# Patient Record
Sex: MALE | Race: WHITE | Employment: FULL TIME | URBAN - METROPOLITAN AREA
[De-identification: names, ages, dates, MRNs, and addresses within clinical notes are randomized per-mention and may not be internally consistent; named-entity substitution may affect disease eponyms.]

---

## 2024-03-13 ENCOUNTER — APPOINTMENT (OUTPATIENT)
Dept: ULTRASOUND IMAGING | Age: 69
End: 2024-03-13
Payer: COMMERCIAL

## 2024-03-13 ENCOUNTER — APPOINTMENT (OUTPATIENT)
Dept: GENERAL RADIOLOGY | Age: 69
End: 2024-03-13
Payer: COMMERCIAL

## 2024-03-13 ENCOUNTER — APPOINTMENT (OUTPATIENT)
Dept: CT IMAGING | Age: 69
End: 2024-03-13
Payer: COMMERCIAL

## 2024-03-13 ENCOUNTER — HOSPITAL ENCOUNTER (EMERGENCY)
Age: 69
Discharge: ANOTHER HOSPITAL | End: 2024-03-13
Attending: STUDENT IN AN ORGANIZED HEALTH CARE EDUCATION/TRAINING PROGRAM
Payer: COMMERCIAL

## 2024-03-13 VITALS
OXYGEN SATURATION: 91 % | WEIGHT: 150 LBS | SYSTOLIC BLOOD PRESSURE: 138 MMHG | TEMPERATURE: 97.4 F | BODY MASS INDEX: 22.22 KG/M2 | RESPIRATION RATE: 45 BRPM | HEIGHT: 69 IN | HEART RATE: 114 BPM | DIASTOLIC BLOOD PRESSURE: 65 MMHG

## 2024-03-13 DIAGNOSIS — J96.01 ACUTE RESPIRATORY FAILURE WITH HYPOXIA (HCC): Primary | ICD-10-CM

## 2024-03-13 DIAGNOSIS — R79.89 ELEVATED BRAIN NATRIURETIC PEPTIDE (BNP) LEVEL: ICD-10-CM

## 2024-03-13 DIAGNOSIS — J44.1 COPD EXACERBATION (HCC): ICD-10-CM

## 2024-03-13 DIAGNOSIS — R60.0 BILATERAL LOWER EXTREMITY EDEMA: ICD-10-CM

## 2024-03-13 DIAGNOSIS — J18.9 PNEUMONIA OF RIGHT LUNG DUE TO INFECTIOUS ORGANISM, UNSPECIFIED PART OF LUNG: ICD-10-CM

## 2024-03-13 LAB
ALBUMIN SERPL-MCNC: 3.6 G/DL (ref 3.5–5.2)
ALP SERPL-CCNC: 119 U/L (ref 40–129)
ALT SERPL-CCNC: 45 U/L (ref 0–40)
ANION GAP SERPL CALCULATED.3IONS-SCNC: 6 MMOL/L (ref 7–16)
AST SERPL-CCNC: 23 U/L (ref 0–39)
B PARAP IS1001 DNA NPH QL NAA+NON-PROBE: NOT DETECTED
B PERT DNA SPEC QL NAA+PROBE: NOT DETECTED
BASOPHILS # BLD: 0.07 K/UL (ref 0–0.2)
BASOPHILS NFR BLD: 1 % (ref 0–2)
BILIRUB SERPL-MCNC: 0.6 MG/DL (ref 0–1.2)
BNP SERPL-MCNC: 2480 PG/ML (ref 0–125)
BUN SERPL-MCNC: 11 MG/DL (ref 6–23)
C PNEUM DNA NPH QL NAA+NON-PROBE: NOT DETECTED
CALCIUM SERPL-MCNC: 8.8 MG/DL (ref 8.6–10.2)
CHLORIDE SERPL-SCNC: 104 MMOL/L (ref 98–107)
CO2 SERPL-SCNC: 34 MMOL/L (ref 22–29)
CREAT SERPL-MCNC: 0.8 MG/DL (ref 0.7–1.2)
EKG ATRIAL RATE: 105 BPM
EKG P AXIS: 80 DEGREES
EKG P-R INTERVAL: 140 MS
EKG Q-T INTERVAL: 340 MS
EKG QRS DURATION: 92 MS
EKG QTC CALCULATION (BAZETT): 449 MS
EKG R AXIS: 94 DEGREES
EKG T AXIS: 66 DEGREES
EKG VENTRICULAR RATE: 105 BPM
EOSINOPHIL # BLD: 0.08 K/UL (ref 0.05–0.5)
EOSINOPHILS RELATIVE PERCENT: 1 % (ref 0–6)
ERYTHROCYTE [DISTWIDTH] IN BLOOD BY AUTOMATED COUNT: 14.9 % (ref 11.5–15)
FLUAV RNA NPH QL NAA+NON-PROBE: NOT DETECTED
FLUAV RNA RESP QL NAA+PROBE: NOT DETECTED
FLUBV RNA NPH QL NAA+NON-PROBE: NOT DETECTED
FLUBV RNA RESP QL NAA+PROBE: NOT DETECTED
GFR SERPL CREATININE-BSD FRML MDRD: >60 ML/MIN/1.73M2
GLUCOSE SERPL-MCNC: 138 MG/DL (ref 74–99)
HADV DNA NPH QL NAA+NON-PROBE: NOT DETECTED
HCOV 229E RNA NPH QL NAA+NON-PROBE: NOT DETECTED
HCOV HKU1 RNA NPH QL NAA+NON-PROBE: NOT DETECTED
HCOV NL63 RNA NPH QL NAA+NON-PROBE: NOT DETECTED
HCOV OC43 RNA NPH QL NAA+NON-PROBE: DETECTED
HCT VFR BLD AUTO: 49.5 % (ref 37–54)
HGB BLD-MCNC: 16.3 G/DL (ref 12.5–16.5)
HMPV RNA NPH QL NAA+NON-PROBE: NOT DETECTED
HPIV1 RNA NPH QL NAA+NON-PROBE: NOT DETECTED
HPIV2 RNA NPH QL NAA+NON-PROBE: NOT DETECTED
HPIV3 RNA NPH QL NAA+NON-PROBE: NOT DETECTED
HPIV4 RNA NPH QL NAA+NON-PROBE: NOT DETECTED
IMM GRANULOCYTES # BLD AUTO: 0.03 K/UL (ref 0–0.58)
IMM GRANULOCYTES NFR BLD: 1 % (ref 0–5)
INR PPP: 1.4
LYMPHOCYTES NFR BLD: 1.15 K/UL (ref 1.5–4)
LYMPHOCYTES RELATIVE PERCENT: 19 % (ref 20–42)
M PNEUMO DNA NPH QL NAA+NON-PROBE: NOT DETECTED
MAGNESIUM SERPL-MCNC: 1.8 MG/DL (ref 1.6–2.6)
MCH RBC QN AUTO: 33.7 PG (ref 26–35)
MCHC RBC AUTO-ENTMCNC: 32.9 G/DL (ref 32–34.5)
MCV RBC AUTO: 102.5 FL (ref 80–99.9)
MONOCYTES NFR BLD: 0.62 K/UL (ref 0.1–0.95)
MONOCYTES NFR BLD: 10 % (ref 2–12)
NEUTROPHILS NFR BLD: 67 % (ref 43–80)
NEUTS SEG NFR BLD: 4.02 K/UL (ref 1.8–7.3)
PLATELET # BLD AUTO: 242 K/UL (ref 130–450)
PMV BLD AUTO: 9.3 FL (ref 7–12)
POTASSIUM SERPL-SCNC: 4.2 MMOL/L (ref 3.5–5)
PROCALCITONIN SERPL-MCNC: 0.07 NG/ML (ref 0–0.08)
PROT SERPL-MCNC: 6.7 G/DL (ref 6.4–8.3)
PROTHROMBIN TIME: 15.9 SEC (ref 9.3–12.4)
RBC # BLD AUTO: 4.83 M/UL (ref 3.8–5.8)
RSV RNA NPH QL NAA+NON-PROBE: NOT DETECTED
RV+EV RNA NPH QL NAA+NON-PROBE: NOT DETECTED
SARS-COV-2 RNA NPH QL NAA+NON-PROBE: NOT DETECTED
SARS-COV-2 RNA RESP QL NAA+PROBE: NOT DETECTED
SODIUM SERPL-SCNC: 144 MMOL/L (ref 132–146)
SOURCE: NORMAL
SPECIMEN DESCRIPTION: ABNORMAL
SPECIMEN DESCRIPTION: NORMAL
TROPONIN I SERPL HS-MCNC: 17 NG/L (ref 0–11)
TROPONIN I SERPL HS-MCNC: 18 NG/L (ref 0–11)
WBC OTHER # BLD: 6 K/UL (ref 4.5–11.5)

## 2024-03-13 PROCEDURE — 83735 ASSAY OF MAGNESIUM: CPT

## 2024-03-13 PROCEDURE — 0202U NFCT DS 22 TRGT SARS-COV-2: CPT

## 2024-03-13 PROCEDURE — 93005 ELECTROCARDIOGRAM TRACING: CPT | Performed by: STUDENT IN AN ORGANIZED HEALTH CARE EDUCATION/TRAINING PROGRAM

## 2024-03-13 PROCEDURE — 2500000003 HC RX 250 WO HCPCS: Performed by: STUDENT IN AN ORGANIZED HEALTH CARE EDUCATION/TRAINING PROGRAM

## 2024-03-13 PROCEDURE — 71045 X-RAY EXAM CHEST 1 VIEW: CPT

## 2024-03-13 PROCEDURE — 93010 ELECTROCARDIOGRAM REPORT: CPT | Performed by: INTERNAL MEDICINE

## 2024-03-13 PROCEDURE — 6360000004 HC RX CONTRAST MEDICATION: Performed by: RADIOLOGY

## 2024-03-13 PROCEDURE — 85025 COMPLETE CBC W/AUTO DIFF WBC: CPT

## 2024-03-13 PROCEDURE — 84145 PROCALCITONIN (PCT): CPT

## 2024-03-13 PROCEDURE — 96375 TX/PRO/DX INJ NEW DRUG ADDON: CPT

## 2024-03-13 PROCEDURE — 87040 BLOOD CULTURE FOR BACTERIA: CPT

## 2024-03-13 PROCEDURE — 6370000000 HC RX 637 (ALT 250 FOR IP): Performed by: STUDENT IN AN ORGANIZED HEALTH CARE EDUCATION/TRAINING PROGRAM

## 2024-03-13 PROCEDURE — 85610 PROTHROMBIN TIME: CPT

## 2024-03-13 PROCEDURE — 2580000003 HC RX 258: Performed by: STUDENT IN AN ORGANIZED HEALTH CARE EDUCATION/TRAINING PROGRAM

## 2024-03-13 PROCEDURE — 96365 THER/PROPH/DIAG IV INF INIT: CPT

## 2024-03-13 PROCEDURE — 84484 ASSAY OF TROPONIN QUANT: CPT

## 2024-03-13 PROCEDURE — 93970 EXTREMITY STUDY: CPT

## 2024-03-13 PROCEDURE — 83880 ASSAY OF NATRIURETIC PEPTIDE: CPT

## 2024-03-13 PROCEDURE — 80053 COMPREHEN METABOLIC PANEL: CPT

## 2024-03-13 PROCEDURE — 87636 SARSCOV2 & INF A&B AMP PRB: CPT

## 2024-03-13 PROCEDURE — 71275 CT ANGIOGRAPHY CHEST: CPT

## 2024-03-13 PROCEDURE — 6360000002 HC RX W HCPCS: Performed by: STUDENT IN AN ORGANIZED HEALTH CARE EDUCATION/TRAINING PROGRAM

## 2024-03-13 PROCEDURE — 99285 EMERGENCY DEPT VISIT HI MDM: CPT

## 2024-03-13 RX ORDER — IPRATROPIUM BROMIDE AND ALBUTEROL SULFATE 2.5; .5 MG/3ML; MG/3ML
3 SOLUTION RESPIRATORY (INHALATION) ONCE
Status: COMPLETED | OUTPATIENT
Start: 2024-03-13 | End: 2024-03-13

## 2024-03-13 RX ORDER — FUROSEMIDE 10 MG/ML
20 INJECTION INTRAMUSCULAR; INTRAVENOUS ONCE
Status: COMPLETED | OUTPATIENT
Start: 2024-03-13 | End: 2024-03-13

## 2024-03-13 RX ADMIN — IPRATROPIUM BROMIDE AND ALBUTEROL SULFATE 3 DOSE: 2.5; .5 SOLUTION RESPIRATORY (INHALATION) at 13:50

## 2024-03-13 RX ADMIN — FUROSEMIDE 20 MG: 10 INJECTION, SOLUTION INTRAMUSCULAR; INTRAVENOUS at 14:01

## 2024-03-13 RX ADMIN — WATER 125 MG: 1 INJECTION INTRAMUSCULAR; INTRAVENOUS; SUBCUTANEOUS at 13:50

## 2024-03-13 RX ADMIN — CEFTRIAXONE SODIUM 2000 MG: 2 INJECTION, POWDER, FOR SOLUTION INTRAMUSCULAR; INTRAVENOUS at 15:22

## 2024-03-13 RX ADMIN — IOPAMIDOL 75 ML: 755 INJECTION, SOLUTION INTRAVENOUS at 14:46

## 2024-03-13 RX ADMIN — DOXYCYCLINE 100 MG: 100 INJECTION, POWDER, LYOPHILIZED, FOR SOLUTION INTRAVENOUS at 15:28

## 2024-03-13 ASSESSMENT — ENCOUNTER SYMPTOMS
CHEST TIGHTNESS: 1
WHEEZING: 1
COUGH: 1
DIARRHEA: 0
ABDOMINAL PAIN: 0
NAUSEA: 0
VOMITING: 0
CONSTIPATION: 0
SHORTNESS OF BREATH: 1

## 2024-03-13 ASSESSMENT — PAIN - FUNCTIONAL ASSESSMENT: PAIN_FUNCTIONAL_ASSESSMENT: NONE - DENIES PAIN

## 2024-03-13 NOTE — ED PROVIDER NOTES
Course, Reassessment, disposition considerations/shared decision making with patient, consults, disposition:      The cardiac monitor revealed sinus tachycardia with a heart rate in the low 100s as interpreted by me. The cardiac monitor was ordered secondary to the patient's SOB and to monitor the patient for dysrhythmia.      [facility]    ED Course as of 03/13/24 1533   Wed Mar 13, 2024   1528 Spoke with Dr. Mcknight, discussed case, patient is accepted for admission/transfer to [facility].  [VG]      ED Course User Index  [VG] Calista Tyler, DO        Medical Decision Making  Amount and/or Complexity of Data Reviewed  Labs: ordered.  Radiology: ordered.  ECG/medicine tests: ordered.    Risk  Prescription drug management.        This is a 69 y.o. male presenting for evaluation of shortness of breath, chest tightness and wheezing and bilateral leg swelling.   Please see HPI for further details, additional history and chart review.   On my evaluation today, patient is alert, oriented, nontoxic in appearance; he is chronically ill-appearing.  Does not appear significantly distressed.  Vitals are initially significant for tachycardia and tachypnea as well as SpO2 86% on room air.  His vitals did improve during this encounter.  He was placed on 3 L NC O2, does not use supplemental oxygen at baseline.    Exam findings are as documented above; he has pitting edema of his bilateral feet and ankles; distal pulses are 2+ and equal throughout.  Compartments are soft.  No redness or warmth or findings to suggest infectious process.  Lungs are diminished with wheezes bilaterally.   Patient was treated with x 3 DuoNebs as well as IV Solu-Medrol.  Labs and imaging were reviewed and labs are concerning for possible new onset CHF, so I did give him a dose of Lasix as well.  He also had findings concerning for pneumonia so I treated him with IV Rocephin and doxycycline.  With patient's history of COPD and these new findings on his workup I  3:33 PM      NOTE: This report was transcribed using voice recognition software. Every effort was made to ensure accuracy; however, inadvertent computerized transcription errors may be present            Calista Tyler DO  03/15/24 2679

## 2024-03-14 ENCOUNTER — APPOINTMENT (OUTPATIENT)
Age: 69
End: 2024-03-14
Attending: INTERNAL MEDICINE
Payer: COMMERCIAL

## 2024-03-14 ENCOUNTER — HOSPITAL ENCOUNTER (INPATIENT)
Age: 69
LOS: 1 days | Discharge: HOME | End: 2024-03-15
Attending: INTERNAL MEDICINE | Admitting: INTERNAL MEDICINE
Payer: COMMERCIAL

## 2024-03-14 DIAGNOSIS — R06.09 DYSPNEA ON EXERTION: ICD-10-CM

## 2024-03-14 DIAGNOSIS — R60.0 EDEMA OF BOTH LOWER LEGS: ICD-10-CM

## 2024-03-14 DIAGNOSIS — R60.0 EDEMA OF BOTH LOWER EXTREMITIES: ICD-10-CM

## 2024-03-14 DIAGNOSIS — J96.21 ACUTE ON CHRONIC RESPIRATORY FAILURE WITH HYPOXIA (HCC): Primary | ICD-10-CM

## 2024-03-14 PROBLEM — J44.1 COPD EXACERBATION (HCC): Status: ACTIVE | Noted: 2024-03-14

## 2024-03-14 PROBLEM — J18.9 PNEUMONIA OF RIGHT LUNG DUE TO INFECTIOUS ORGANISM: Status: ACTIVE | Noted: 2024-03-14

## 2024-03-14 PROBLEM — J96.01 ACUTE HYPOXIC RESPIRATORY FAILURE (HCC): Status: ACTIVE | Noted: 2024-03-14

## 2024-03-14 PROBLEM — F17.200 TOBACCO DEPENDENCE: Status: ACTIVE | Noted: 2024-03-14

## 2024-03-14 PROBLEM — R79.89 ELEVATED BRAIN NATRIURETIC PEPTIDE (BNP) LEVEL: Status: ACTIVE | Noted: 2024-03-14

## 2024-03-14 LAB
ALBUMIN SERPL-MCNC: 3.4 G/DL (ref 3.5–5.2)
ALP SERPL-CCNC: 120 U/L (ref 40–129)
ALT SERPL-CCNC: 39 U/L (ref 0–40)
ANION GAP SERPL CALCULATED.3IONS-SCNC: 7 MMOL/L (ref 7–16)
AST SERPL-CCNC: 19 U/L (ref 0–39)
BILIRUB SERPL-MCNC: 0.3 MG/DL (ref 0–1.2)
BUN SERPL-MCNC: 20 MG/DL (ref 6–23)
CALCIUM SERPL-MCNC: 8.6 MG/DL (ref 8.6–10.2)
CHLORIDE SERPL-SCNC: 104 MMOL/L (ref 98–107)
CO2 SERPL-SCNC: 37 MMOL/L (ref 22–29)
CREAT SERPL-MCNC: 0.9 MG/DL (ref 0.7–1.2)
CRP SERPL HS-MCNC: 4 MG/L (ref 0–5)
ECHO AO ASC DIAM: 3 CM
ECHO AO ASCENDING AORTA INDEX: 1.64 CM/M2
ECHO AV AREA PEAK VELOCITY: 1.7 CM2
ECHO AV AREA VTI: 2 CM2
ECHO AV AREA/BSA PEAK VELOCITY: 0.9 CM2/M2
ECHO AV AREA/BSA VTI: 1.1 CM2/M2
ECHO AV CUSP MM: 1.9 CM
ECHO AV MEAN GRADIENT: 10 MMHG
ECHO AV MEAN VELOCITY: 1.4 M/S
ECHO AV PEAK GRADIENT: 21 MMHG
ECHO AV PEAK VELOCITY: 2.3 M/S
ECHO AV VELOCITY RATIO: 0.57
ECHO AV VTI: 37.4 CM
ECHO BSA: 1.82 M2
ECHO EST RA PRESSURE: 3 MMHG
ECHO LA DIAMETER INDEX: 1.97 CM/M2
ECHO LA DIAMETER: 3.6 CM
ECHO LA VOL A-L A2C: 63 ML (ref 18–58)
ECHO LA VOL A-L A4C: 46 ML (ref 18–58)
ECHO LA VOL MOD A2C: 59 ML (ref 18–58)
ECHO LA VOL MOD A4C: 43 ML (ref 18–58)
ECHO LA VOLUME AREA LENGTH: 58 ML
ECHO LA VOLUME INDEX A-L A2C: 34 ML/M2 (ref 16–34)
ECHO LA VOLUME INDEX A-L A4C: 25 ML/M2 (ref 16–34)
ECHO LA VOLUME INDEX AREA LENGTH: 32 ML/M2 (ref 16–34)
ECHO LA VOLUME INDEX MOD A2C: 32 ML/M2 (ref 16–34)
ECHO LA VOLUME INDEX MOD A4C: 23 ML/M2 (ref 16–34)
ECHO LV E' LATERAL VELOCITY: 14 CM/S
ECHO LV E' SEPTAL VELOCITY: 8 CM/S
ECHO LV FRACTIONAL SHORTENING: 36 % (ref 28–44)
ECHO LV INTERNAL DIMENSION DIASTOLE INDEX: 2.57 CM/M2
ECHO LV INTERNAL DIMENSION DIASTOLIC: 4.7 CM (ref 4.2–5.9)
ECHO LV INTERNAL DIMENSION SYSTOLIC INDEX: 1.64 CM/M2
ECHO LV INTERNAL DIMENSION SYSTOLIC: 3 CM
ECHO LV ISOVOLUMETRIC RELAXATION TIME (IVRT): 50.8 MS
ECHO LV IVSD: 0.8 CM (ref 0.6–1)
ECHO LV IVSS: 1.3 CM
ECHO LV MASS 2D: 122.3 G (ref 88–224)
ECHO LV MASS INDEX 2D: 66.8 G/M2 (ref 49–115)
ECHO LV POSTERIOR WALL DIASTOLIC: 0.8 CM (ref 0.6–1)
ECHO LV POSTERIOR WALL SYSTOLIC: 1.2 CM
ECHO LV RELATIVE WALL THICKNESS RATIO: 0.34
ECHO LVOT AREA: 3.1 CM2
ECHO LVOT AV VTI INDEX: 0.65
ECHO LVOT DIAM: 2 CM
ECHO LVOT MEAN GRADIENT: 3 MMHG
ECHO LVOT PEAK GRADIENT: 6 MMHG
ECHO LVOT PEAK VELOCITY: 1.3 M/S
ECHO LVOT STROKE VOLUME INDEX: 41.9 ML/M2
ECHO LVOT SV: 76.6 ML
ECHO LVOT VTI: 24.4 CM
ECHO MV "A" WAVE DURATION: 110.7 MSEC
ECHO MV A VELOCITY: 1.23 M/S
ECHO MV AREA PHT: 4.6 CM2
ECHO MV AREA VTI: 2.8 CM2
ECHO MV E DECELERATION TIME (DT): 211.4 MS
ECHO MV E VELOCITY: 1.12 M/S
ECHO MV E/A RATIO: 0.91
ECHO MV E/E' LATERAL: 8
ECHO MV E/E' RATIO (AVERAGED): 11
ECHO MV LVOT VTI INDEX: 1.12
ECHO MV MAX VELOCITY: 1.2 M/S
ECHO MV MEAN GRADIENT: 3 MMHG
ECHO MV MEAN VELOCITY: 0.8 M/S
ECHO MV PEAK GRADIENT: 6 MMHG
ECHO MV PRESSURE HALF TIME (PHT): 47.8 MS
ECHO MV VTI: 27.3 CM
ECHO PV MAX VELOCITY: 1.2 M/S
ECHO PV MEAN GRADIENT: 2 MMHG
ECHO PV MEAN VELOCITY: 0.7 M/S
ECHO PV PEAK GRADIENT: 5 MMHG
ECHO PV VTI: 18.9 CM
ECHO PVEIN A DURATION: 92.3 MS
ECHO PVEIN A VELOCITY: 0.4 M/S
ECHO PVEIN PEAK D VELOCITY: 0.6 M/S
ECHO PVEIN PEAK S VELOCITY: 0.8 M/S
ECHO PVEIN S/D RATIO: 1.3
ECHO RIGHT VENTRICULAR SYSTOLIC PRESSURE (RVSP): 70 MMHG
ECHO RV INTERNAL DIMENSION: 3.9 CM
ECHO RV TAPSE: 3 CM (ref 1.7–?)
ECHO TV REGURGITANT MAX VELOCITY: 4.09 M/S
ECHO TV REGURGITANT PEAK GRADIENT: 67 MMHG
ERYTHROCYTE [SEDIMENTATION RATE] IN BLOOD BY WESTERGREN METHOD: 2 MM/HR (ref 0–15)
GFR SERPL CREATININE-BSD FRML MDRD: >60 ML/MIN/1.73M2
GLUCOSE SERPL-MCNC: 119 MG/DL (ref 74–99)
POTASSIUM SERPL-SCNC: 4.9 MMOL/L (ref 3.5–5)
PROT SERPL-MCNC: 6.3 G/DL (ref 6.4–8.3)
SODIUM SERPL-SCNC: 148 MMOL/L (ref 132–146)
T4 FREE SERPL-MCNC: 1.3 NG/DL (ref 0.9–1.7)
TSH SERPL DL<=0.05 MIU/L-ACNC: 0.21 UIU/ML (ref 0.27–4.2)

## 2024-03-14 PROCEDURE — 87449 NOS EACH ORGANISM AG IA: CPT

## 2024-03-14 PROCEDURE — 6360000002 HC RX W HCPCS: Performed by: STUDENT IN AN ORGANIZED HEALTH CARE EDUCATION/TRAINING PROGRAM

## 2024-03-14 PROCEDURE — 6370000000 HC RX 637 (ALT 250 FOR IP): Performed by: NURSE PRACTITIONER

## 2024-03-14 PROCEDURE — 87899 AGENT NOS ASSAY W/OPTIC: CPT

## 2024-03-14 PROCEDURE — 36415 COLL VENOUS BLD VENIPUNCTURE: CPT

## 2024-03-14 PROCEDURE — 6370000000 HC RX 637 (ALT 250 FOR IP): Performed by: STUDENT IN AN ORGANIZED HEALTH CARE EDUCATION/TRAINING PROGRAM

## 2024-03-14 PROCEDURE — 84439 ASSAY OF FREE THYROXINE: CPT

## 2024-03-14 PROCEDURE — 6360000002 HC RX W HCPCS: Performed by: NURSE PRACTITIONER

## 2024-03-14 PROCEDURE — 93306 TTE W/DOPPLER COMPLETE: CPT | Performed by: INTERNAL MEDICINE

## 2024-03-14 PROCEDURE — 86140 C-REACTIVE PROTEIN: CPT

## 2024-03-14 PROCEDURE — 84443 ASSAY THYROID STIM HORMONE: CPT

## 2024-03-14 PROCEDURE — 85652 RBC SED RATE AUTOMATED: CPT

## 2024-03-14 PROCEDURE — 2700000000 HC OXYGEN THERAPY PER DAY

## 2024-03-14 PROCEDURE — 2580000003 HC RX 258: Performed by: NURSE PRACTITIONER

## 2024-03-14 PROCEDURE — 80053 COMPREHEN METABOLIC PANEL: CPT

## 2024-03-14 PROCEDURE — APPSS60 APP SPLIT SHARED TIME 46-60 MINUTES: Performed by: NURSE PRACTITIONER

## 2024-03-14 PROCEDURE — 93306 TTE W/DOPPLER COMPLETE: CPT

## 2024-03-14 PROCEDURE — 87081 CULTURE SCREEN ONLY: CPT

## 2024-03-14 PROCEDURE — 87070 CULTURE OTHR SPECIMN AEROBIC: CPT

## 2024-03-14 PROCEDURE — 87205 SMEAR GRAM STAIN: CPT

## 2024-03-14 PROCEDURE — 94640 AIRWAY INHALATION TREATMENT: CPT

## 2024-03-14 PROCEDURE — 99222 1ST HOSP IP/OBS MODERATE 55: CPT | Performed by: STUDENT IN AN ORGANIZED HEALTH CARE EDUCATION/TRAINING PROGRAM

## 2024-03-14 PROCEDURE — 2060000000 HC ICU INTERMEDIATE R&B

## 2024-03-14 PROCEDURE — 94664 DEMO&/EVAL PT USE INHALER: CPT

## 2024-03-14 RX ORDER — BENZONATATE 100 MG/1
100 CAPSULE ORAL 3 TIMES DAILY PRN
Status: DISCONTINUED | OUTPATIENT
Start: 2024-03-14 | End: 2024-03-15 | Stop reason: HOSPADM

## 2024-03-14 RX ORDER — ENOXAPARIN SODIUM 100 MG/ML
40 INJECTION SUBCUTANEOUS DAILY
Status: DISCONTINUED | OUTPATIENT
Start: 2024-03-14 | End: 2024-03-15 | Stop reason: HOSPADM

## 2024-03-14 RX ORDER — IPRATROPIUM BROMIDE AND ALBUTEROL SULFATE 2.5; .5 MG/3ML; MG/3ML
1 SOLUTION RESPIRATORY (INHALATION)
Status: DISCONTINUED | OUTPATIENT
Start: 2024-03-14 | End: 2024-03-15 | Stop reason: HOSPADM

## 2024-03-14 RX ORDER — ACETAMINOPHEN 650 MG/1
650 SUPPOSITORY RECTAL EVERY 6 HOURS PRN
Status: DISCONTINUED | OUTPATIENT
Start: 2024-03-14 | End: 2024-03-15 | Stop reason: HOSPADM

## 2024-03-14 RX ORDER — ALBUTEROL SULFATE 2.5 MG/3ML
2.5 SOLUTION RESPIRATORY (INHALATION)
Status: DISCONTINUED | OUTPATIENT
Start: 2024-03-14 | End: 2024-03-14

## 2024-03-14 RX ORDER — FUROSEMIDE 10 MG/ML
20 INJECTION INTRAMUSCULAR; INTRAVENOUS 2 TIMES DAILY
Status: DISCONTINUED | OUTPATIENT
Start: 2024-03-14 | End: 2024-03-14

## 2024-03-14 RX ORDER — POTASSIUM CHLORIDE 7.45 MG/ML
10 INJECTION INTRAVENOUS PRN
Status: DISCONTINUED | OUTPATIENT
Start: 2024-03-14 | End: 2024-03-15 | Stop reason: HOSPADM

## 2024-03-14 RX ORDER — DOXYCYCLINE HYCLATE 100 MG/1
100 CAPSULE ORAL EVERY 12 HOURS SCHEDULED
Status: DISCONTINUED | OUTPATIENT
Start: 2024-03-14 | End: 2024-03-14

## 2024-03-14 RX ORDER — ONDANSETRON 2 MG/ML
4 INJECTION INTRAMUSCULAR; INTRAVENOUS EVERY 6 HOURS PRN
Status: DISCONTINUED | OUTPATIENT
Start: 2024-03-14 | End: 2024-03-15 | Stop reason: HOSPADM

## 2024-03-14 RX ORDER — SODIUM CHLORIDE 9 MG/ML
INJECTION, SOLUTION INTRAVENOUS PRN
Status: DISCONTINUED | OUTPATIENT
Start: 2024-03-14 | End: 2024-03-15 | Stop reason: HOSPADM

## 2024-03-14 RX ORDER — POTASSIUM CHLORIDE 20 MEQ/1
40 TABLET, EXTENDED RELEASE ORAL PRN
Status: DISCONTINUED | OUTPATIENT
Start: 2024-03-14 | End: 2024-03-15 | Stop reason: HOSPADM

## 2024-03-14 RX ORDER — SODIUM CHLORIDE 0.9 % (FLUSH) 0.9 %
5-40 SYRINGE (ML) INJECTION PRN
Status: DISCONTINUED | OUTPATIENT
Start: 2024-03-14 | End: 2024-03-15 | Stop reason: HOSPADM

## 2024-03-14 RX ORDER — MAGNESIUM SULFATE IN WATER 40 MG/ML
2000 INJECTION, SOLUTION INTRAVENOUS PRN
Status: DISCONTINUED | OUTPATIENT
Start: 2024-03-14 | End: 2024-03-15 | Stop reason: HOSPADM

## 2024-03-14 RX ORDER — FUROSEMIDE 40 MG/1
40 TABLET ORAL DAILY
Status: DISCONTINUED | OUTPATIENT
Start: 2024-03-15 | End: 2024-03-15 | Stop reason: HOSPADM

## 2024-03-14 RX ORDER — BUDESONIDE 0.5 MG/2ML
0.5 INHALANT ORAL
Status: DISCONTINUED | OUTPATIENT
Start: 2024-03-14 | End: 2024-03-15 | Stop reason: HOSPADM

## 2024-03-14 RX ORDER — GUAIFENESIN 400 MG/1
400 TABLET ORAL 3 TIMES DAILY
Status: DISCONTINUED | OUTPATIENT
Start: 2024-03-14 | End: 2024-03-15 | Stop reason: HOSPADM

## 2024-03-14 RX ORDER — ONDANSETRON 4 MG/1
4 TABLET, ORALLY DISINTEGRATING ORAL EVERY 8 HOURS PRN
Status: DISCONTINUED | OUTPATIENT
Start: 2024-03-14 | End: 2024-03-15 | Stop reason: HOSPADM

## 2024-03-14 RX ORDER — DOXYCYCLINE HYCLATE 100 MG/1
100 CAPSULE ORAL EVERY 12 HOURS SCHEDULED
Status: DISCONTINUED | OUTPATIENT
Start: 2024-03-14 | End: 2024-03-15 | Stop reason: HOSPADM

## 2024-03-14 RX ORDER — METHYLPREDNISOLONE SODIUM SUCCINATE 40 MG/ML
40 INJECTION, POWDER, LYOPHILIZED, FOR SOLUTION INTRAMUSCULAR; INTRAVENOUS EVERY 12 HOURS
Status: DISCONTINUED | OUTPATIENT
Start: 2024-03-14 | End: 2024-03-15

## 2024-03-14 RX ORDER — POLYETHYLENE GLYCOL 3350 17 G/17G
17 POWDER, FOR SOLUTION ORAL DAILY PRN
Status: DISCONTINUED | OUTPATIENT
Start: 2024-03-14 | End: 2024-03-15 | Stop reason: HOSPADM

## 2024-03-14 RX ORDER — ARFORMOTEROL TARTRATE 15 UG/2ML
15 SOLUTION RESPIRATORY (INHALATION)
Status: DISCONTINUED | OUTPATIENT
Start: 2024-03-14 | End: 2024-03-15 | Stop reason: HOSPADM

## 2024-03-14 RX ORDER — SODIUM CHLORIDE 0.9 % (FLUSH) 0.9 %
5-40 SYRINGE (ML) INJECTION EVERY 12 HOURS SCHEDULED
Status: DISCONTINUED | OUTPATIENT
Start: 2024-03-14 | End: 2024-03-15 | Stop reason: HOSPADM

## 2024-03-14 RX ORDER — PANTOPRAZOLE SODIUM 40 MG/1
40 TABLET, DELAYED RELEASE ORAL
Status: DISCONTINUED | OUTPATIENT
Start: 2024-03-14 | End: 2024-03-15 | Stop reason: HOSPADM

## 2024-03-14 RX ORDER — AMOXICILLIN AND CLAVULANATE POTASSIUM 875; 125 MG/1; MG/1
1 TABLET, FILM COATED ORAL EVERY 12 HOURS SCHEDULED
Status: DISCONTINUED | OUTPATIENT
Start: 2024-03-14 | End: 2024-03-15 | Stop reason: HOSPADM

## 2024-03-14 RX ORDER — ACETAMINOPHEN 325 MG/1
650 TABLET ORAL EVERY 6 HOURS PRN
Status: DISCONTINUED | OUTPATIENT
Start: 2024-03-14 | End: 2024-03-15 | Stop reason: HOSPADM

## 2024-03-14 RX ADMIN — DOXYCYCLINE HYCLATE 100 MG: 100 CAPSULE ORAL at 21:08

## 2024-03-14 RX ADMIN — GUAIFENESIN 400 MG: 400 TABLET ORAL at 21:08

## 2024-03-14 RX ADMIN — PANTOPRAZOLE SODIUM 40 MG: 40 TABLET, DELAYED RELEASE ORAL at 13:30

## 2024-03-14 RX ADMIN — METHYLPREDNISOLONE SODIUM SUCCINATE 40 MG: 40 INJECTION INTRAMUSCULAR; INTRAVENOUS at 06:06

## 2024-03-14 RX ADMIN — ARFORMOTEROL TARTRATE 15 MCG: 15 SOLUTION RESPIRATORY (INHALATION) at 20:46

## 2024-03-14 RX ADMIN — IPRATROPIUM BROMIDE AND ALBUTEROL SULFATE 1 DOSE: 2.5; .5 SOLUTION RESPIRATORY (INHALATION) at 12:26

## 2024-03-14 RX ADMIN — IPRATROPIUM BROMIDE AND ALBUTEROL SULFATE 1 DOSE: 2.5; .5 SOLUTION RESPIRATORY (INHALATION) at 20:46

## 2024-03-14 RX ADMIN — AMOXICILLIN AND CLAVULANATE POTASSIUM 1 TABLET: 875; 125 TABLET, FILM COATED ORAL at 13:30

## 2024-03-14 RX ADMIN — BUDESONIDE 500 MCG: 0.5 SUSPENSION RESPIRATORY (INHALATION) at 20:46

## 2024-03-14 RX ADMIN — ENOXAPARIN SODIUM 40 MG: 100 INJECTION SUBCUTANEOUS at 08:57

## 2024-03-14 RX ADMIN — SODIUM CHLORIDE, PRESERVATIVE FREE 10 ML: 5 INJECTION INTRAVENOUS at 21:08

## 2024-03-14 RX ADMIN — METHYLPREDNISOLONE SODIUM SUCCINATE 40 MG: 40 INJECTION INTRAMUSCULAR; INTRAVENOUS at 18:23

## 2024-03-14 RX ADMIN — IPRATROPIUM BROMIDE AND ALBUTEROL SULFATE 1 DOSE: 2.5; .5 SOLUTION RESPIRATORY (INHALATION) at 16:42

## 2024-03-14 RX ADMIN — AMOXICILLIN AND CLAVULANATE POTASSIUM 1 TABLET: 875; 125 TABLET, FILM COATED ORAL at 21:08

## 2024-03-14 RX ADMIN — ARFORMOTEROL TARTRATE 15 MCG: 15 SOLUTION RESPIRATORY (INHALATION) at 12:26

## 2024-03-14 RX ADMIN — DOXYCYCLINE HYCLATE 100 MG: 100 CAPSULE ORAL at 13:30

## 2024-03-14 RX ADMIN — SODIUM CHLORIDE, PRESERVATIVE FREE 10 ML: 5 INJECTION INTRAVENOUS at 08:52

## 2024-03-14 RX ADMIN — GUAIFENESIN 400 MG: 400 TABLET ORAL at 08:50

## 2024-03-14 RX ADMIN — IPRATROPIUM BROMIDE AND ALBUTEROL SULFATE 1 DOSE: 2.5; .5 SOLUTION RESPIRATORY (INHALATION) at 08:00

## 2024-03-14 RX ADMIN — GUAIFENESIN 400 MG: 400 TABLET ORAL at 15:08

## 2024-03-14 RX ADMIN — FUROSEMIDE 20 MG: 10 INJECTION, SOLUTION INTRAVENOUS at 08:50

## 2024-03-14 RX ADMIN — BUDESONIDE 500 MCG: 0.5 SUSPENSION RESPIRATORY (INHALATION) at 12:26

## 2024-03-14 ASSESSMENT — PAIN SCALES - GENERAL
PAINLEVEL_OUTOF10: 0

## 2024-03-14 NOTE — PROGRESS NOTES
While rounding this  found the patient lying in bed and asleep. The patient appeared peaceful, didn't disturb. Prayer was said for the patient and a prayer card was left in the patient's room.  remains available for support.

## 2024-03-14 NOTE — PROGRESS NOTES
[facility] Quality Flow/Interdisciplinary Rounds Progress Note        Quality Flow Rounds held on March 14, 2024    Disciplines Attending:  Bedside Nurse, , , and Nursing Unit Leadership    Subhash Foremanmmeldon was admitted on 3/14/2024  1:28 AM    Anticipated Discharge Date:  Expected Discharge Date: 03/16/24    Disposition:    Matteo Score:  Matteo Scale Score: 22    Readmission Risk              Risk of Unplanned Readmission:  10           Discussed patient goal for the day, patient clinical progression, and barriers to discharge.  The following Goal(s) of the Day/Commitment(s) have been identified:   Wean 02 as tolerated, Echo       Deonna Prajapati RN  March 14, 2024

## 2024-03-14 NOTE — PLAN OF CARE
Patient admitted by nighttime hospitalist earlier today. Patient was seen and examined today. SOB improved, states he feels better    GEN: NAD  HEENT: NC AT  Cardio: RRR  Pulm: CTAB  Ext: 2+ edema    Acute hypoxic respiratory failure 2/2 COPD exacerbation 2/2 Coronavirus OC and CAP - initial SpO2 86% on RA, SOB, no O2 at baseline. On 3L, had ambulatory pulse oximetry and needed placed back on oxygen  CAP - RUL and RML PNA on imaging. Switch antibiotics to PO Doxycycline and Augmentin for total of 5 days  COPD exacerbation - no previous COPD diagnosis, does not follow with PCP. Significant smoking history, emphysematous changes on CT. Continue Solumedrol, plan to switch to oral steroids tomorrow. Continue breathing treatments  HFpEF - BLE edema, new onset, negative DVT US. ProBNP 2480, echo EF 60-65% S1DD, severe pulmonary hypertension. Switch to Lasix 40 mg PO daily tomorrow. Need Pulmonology follow up as well  Tobacco Use Disorder - 1-1.5 ppd x 54 years. Tobacco cessation counseling offered, patient declines    Code Status: Full code  DVT/GI Ppx: Lovenox/Protonix  Dispo: Continue care, possibly discharge tomorrow    Electronically signed by Malcolm Mejia MD on 3/14/2024 at 4:20 PM

## 2024-03-14 NOTE — CARE COORDINATION
Introduced my self and provided explanation of CM role to patient. Patient admitted for SOB, edema BLE, Pneumonia right lung, hx of COPD. Currently on 3L of O2:  Qualifies for home oxygen per oxygen ambulating testing. .Patient given DME list, he does not have preference of supplier, Edilberto from [facility] following [phone]. He voices he resides at home alone in 3rd floor apartment uses stairs,no elevator. He completes his adl's with independence. Patient is established with Dr. Francois and denies any issue with retail pharmaceutical coverage uses [facility] on [address]. Patient will need followed and assisted with discharge planning as necessary.   Cat NATHANN, RN  Case Management

## 2024-03-14 NOTE — ACP (ADVANCE CARE PLANNING)
Advance Care Planning   Healthcare Decision Maker:      Click here to complete Healthcare Decision Makers including selection of the Healthcare Decision Maker Relationship (ie \"Primary\").  Today we documented Decision Maker(s) consistent with ACP documents on file.

## 2024-03-14 NOTE — H&P
[facility] Hospitalist Group History and Physical      CHIEF COMPLAINT: Lower extremity swelling and cough    History of Present Illness: This is a 69-year-old male with PMH significant for cataract s/p cataract surgery.  Does not take any medications on a daily basis and does not follow with PCP.  Does admit to smoking 1-1/2 packs of cigarettes daily for the last 54 years.  To [place] ED due to upper respiratory symptoms including runny nose, cough, shortness of breath on exertion.  Also complaining of bilateral lower extremity swelling that started 4-5 days ago.  Patient denies fever, chills, chest pain, nausea/vomiting, abdominal pain, and changes in bowel/bladder habits.  Normally on room air at home.  Oxygenation saturation 86% on room air in ED.  O2 3 L via nasal cannula applied.  Labs remarkable for BNP 2480 and troponin 17 and then 18.  CTA showing pneumonia.  Bilateral lower extremities negative for DVT.  Patient given Rocephin, Doxy, Lasix 20 mg IV, and Solu-Medrol 125 mg in ED.  Patient denies ever having swelling in his lower extremities prior to this.  Will admit for further evaluation and treatment.    Informant(s) for H&P: Patient and chart review    REVIEW OF SYSTEMS:  A comprehensive review of systems was negative except for: what is in the HPI      PMH:  Past Medical History:   Diagnosis Date    Cataract        Surgical History:  Past Surgical History:   Procedure Laterality Date    EYE SURGERY         Medications Prior to Admission:    Prior to Admission medications    Not on File       Allergies:    Patient has no known allergies.    Social History:    reports that he has been smoking cigarettes. He has never used smokeless tobacco. He reports current alcohol use. He reports that he does not use drugs.    Family History:   family history is not on file.  COPD      PHYSICAL EXAM:  Vitals:  BP (!) 128/59   Pulse 91   Temp 98 °F (36.7 °C)   Resp 22   SpO2 91%     General Appearance:  alert and oriented to person, place and time and in no acute distress  Skin: warm and dry  Head: normocephalic and atraumatic  Eyes: pupils equal, round, and reactive to light, conjunctivae normal  Pulmonary/Chest: Lung sounds diminished with expiratory wheezes, rales, and rhonchi.  Cardiovascular: normal rate, normal S1 and S2   Abdomen: soft, non-tender, non-distended, normal bowel sounds, no masses or organomegaly  Extremities: no cyanosis, no clubbing, 2+ bilateral lower extremity edema.  Neurologic: speech normal        LABS:  Recent Labs     03/13/24  1300      K 4.2      CO2 34*   BUN 11   CREATININE 0.8   GLUCOSE 138*   CALCIUM 8.8       Recent Labs     03/13/24  1300   WBC 6.0   RBC 4.83   HGB 16.3   HCT 49.5   .5*   MCH 33.7   MCHC 32.9   RDW 14.9      MPV 9.3       No results for input(s): \"POCGLU\" in the last 72 hours.        Radiology:   No orders to display       EKG:     ASSESSMENT:      Principal Problem:    Acute on chronic respiratory failure with hypoxia (HCC)  Active Problems:    Tobacco dependence    COPD exacerbation (HCC)    Pneumonia of right lung due to infectious organism    Elevated brain natriuretic peptide (BNP) level  Resolved Problems:    * No resolved hospital problems. *      PLAN:    1.  Acute on chronic respiratory failure with hypoxia-wean oxygen of 3 L back to baseline of room air.  Patient was 86% on room air.  Likely due to #2, 3, and 4.  Albuterol every 4 hours while awake and as needed.  Solu-Medrol.    2.  Pneumonia of right lung due to infectious organism-viral versus bacteria.  Check urine antigens, procalcitonin, CRP, and sed rate.  Started on Rocephin and Doxy in ED.  Continue antibiotics for now.    3.  Coronavirus OC positive-supportive treatment.    4.  COPD exacerbation-never diagnosed but has been smoking 1-1/2 packs of cigarettes for 54 years.  Not on any medications.  Normally on room air.  Increased cough and expiratory wheezes noted.

## 2024-03-14 NOTE — PROGRESS NOTES
Pulse ox was 91% on room air at rest.  Ambulated patient on room air.  Oxygen saturation was 87% on room air while ambulating.  Oxygen applied.  Recovery pulse ox was 91% on 2 Liters of oxygen while ambulating.

## 2024-03-14 NOTE — PROGRESS NOTES
Patient stated that he wants to go outside and smoke cigarettes while he is in the hospital. I explained to him that he needs to wear oxygen to breathe and keep his pulse ox up, and it is dangerous to smoke while wearing oxygen because of the risk of fire or explosion. I offered to get an order for a nicotine patch. Patient declined a nicotine patch and states that he understands why I am advising him not to smoke cigarettes, but he still plans to go outside when he feels the need to smoke. Patient clearly has cigarettes but refuses to give them to staff. Clinical manager updated, she asks that if this patient leaves the floor to let her know so security can be made aware.

## 2024-03-14 NOTE — PROGRESS NOTES
4 Eyes Skin Assessment     NAME:  Subhash Rojo  YOB: 1955  MEDICAL RECORD NUMBER:  [id number]    The patient is being assessed for  Admission    I agree that at least one RN has performed a thorough Head to Toe Skin Assessment on the patient. ALL assessment sites listed below have been assessed.      Areas assessed by both nurses:    Head, Face, Ears, Shoulders, Back, Chest, Arms, Elbows, Hands, Sacrum. Buttock, Coccyx, Ischium, Legs. Feet and Heels, Under Medical Devices , and Other          Does the Patient have a Wound? No noted wound(s)       Matteo Prevention initiated by RN: No  Wound Care Orders initiated by RN: No    Pressure Injury (Stage 3,4, Unstageable, DTI, NWPT, and Complex wounds) if present, place Wound referral order by RN under : No    New Ostomies, if present place, Ostomy referral order under : No     Nurse 1 eSignature: Electronically signed by Azucena Burkett RN on 3/14/24 at 1:54 AM EDT    **SHARE this note so that the co-signing nurse can place an eSignature**    Nurse 2 eSignature: Electronically signed by Ela Sandoval RN on 3/14/24 at 1:56 AM EDT

## 2024-03-15 VITALS
RESPIRATION RATE: 20 BRPM | SYSTOLIC BLOOD PRESSURE: 107 MMHG | WEIGHT: 150 LBS | HEIGHT: 69 IN | TEMPERATURE: 98.7 F | DIASTOLIC BLOOD PRESSURE: 60 MMHG | BODY MASS INDEX: 22.22 KG/M2 | OXYGEN SATURATION: 95 % | HEART RATE: 97 BPM

## 2024-03-15 PROBLEM — J18.9 PNEUMONIA, UNSPECIFIED ORGANISM: Status: ACTIVE | Noted: 2024-03-15

## 2024-03-15 LAB
ALBUMIN SERPL-MCNC: 3.4 G/DL (ref 3.5–5.2)
ALP SERPL-CCNC: 103 U/L (ref 40–129)
ALT SERPL-CCNC: 33 U/L (ref 0–40)
ANION GAP SERPL CALCULATED.3IONS-SCNC: 6 MMOL/L (ref 7–16)
AST SERPL-CCNC: 16 U/L (ref 0–39)
BASOPHILS # BLD: 0.01 K/UL (ref 0–0.2)
BASOPHILS NFR BLD: 0 % (ref 0–2)
BILIRUB SERPL-MCNC: 0.3 MG/DL (ref 0–1.2)
BUN SERPL-MCNC: 24 MG/DL (ref 6–23)
CALCIUM SERPL-MCNC: 8.5 MG/DL (ref 8.6–10.2)
CHLORIDE SERPL-SCNC: 104 MMOL/L (ref 98–107)
CO2 SERPL-SCNC: 34 MMOL/L (ref 22–29)
CREAT SERPL-MCNC: 0.8 MG/DL (ref 0.7–1.2)
EOSINOPHIL # BLD: 0 K/UL (ref 0.05–0.5)
EOSINOPHILS RELATIVE PERCENT: 0 % (ref 0–6)
ERYTHROCYTE [DISTWIDTH] IN BLOOD BY AUTOMATED COUNT: 15.1 % (ref 11.5–15)
GFR SERPL CREATININE-BSD FRML MDRD: >60 ML/MIN/1.73M2
GLUCOSE SERPL-MCNC: 103 MG/DL (ref 74–99)
HCT VFR BLD AUTO: 48.5 % (ref 37–54)
HGB BLD-MCNC: 15.3 G/DL (ref 12.5–16.5)
IMM GRANULOCYTES # BLD AUTO: <0.03 K/UL (ref 0–0.58)
IMM GRANULOCYTES NFR BLD: 0 % (ref 0–5)
L PNEUMO1 AG UR QL IA.RAPID: NEGATIVE
LYMPHOCYTES NFR BLD: 0.44 K/UL (ref 1.5–4)
LYMPHOCYTES RELATIVE PERCENT: 7 % (ref 20–42)
MCH RBC QN AUTO: 33.7 PG (ref 26–35)
MCHC RBC AUTO-ENTMCNC: 31.5 G/DL (ref 32–34.5)
MCV RBC AUTO: 106.8 FL (ref 80–99.9)
MICROORGANISM SPEC CULT: NORMAL
MICROORGANISM SPEC CULT: NORMAL
MICROORGANISM/AGENT SPEC: NORMAL
MONOCYTES NFR BLD: 0.44 K/UL (ref 0.1–0.95)
MONOCYTES NFR BLD: 7 % (ref 2–12)
NEUTROPHILS NFR BLD: 86 % (ref 43–80)
NEUTS SEG NFR BLD: 5.47 K/UL (ref 1.8–7.3)
PLATELET # BLD AUTO: 209 K/UL (ref 130–450)
PMV BLD AUTO: 9.5 FL (ref 7–12)
POTASSIUM SERPL-SCNC: 4.6 MMOL/L (ref 3.5–5)
PROT SERPL-MCNC: 6.4 G/DL (ref 6.4–8.3)
RBC # BLD AUTO: 4.54 M/UL (ref 3.8–5.8)
S PNEUM AG SPEC QL: NEGATIVE
SODIUM SERPL-SCNC: 144 MMOL/L (ref 132–146)
SPECIMEN DESCRIPTION: NORMAL
SPECIMEN DESCRIPTION: NORMAL
SPECIMEN SOURCE: NORMAL
WBC OTHER # BLD: 6.4 K/UL (ref 4.5–11.5)

## 2024-03-15 PROCEDURE — 6360000002 HC RX W HCPCS: Performed by: STUDENT IN AN ORGANIZED HEALTH CARE EDUCATION/TRAINING PROGRAM

## 2024-03-15 PROCEDURE — 99239 HOSP IP/OBS DSCHRG MGMT >30: CPT | Performed by: STUDENT IN AN ORGANIZED HEALTH CARE EDUCATION/TRAINING PROGRAM

## 2024-03-15 PROCEDURE — 6370000000 HC RX 637 (ALT 250 FOR IP): Performed by: STUDENT IN AN ORGANIZED HEALTH CARE EDUCATION/TRAINING PROGRAM

## 2024-03-15 PROCEDURE — 6360000002 HC RX W HCPCS: Performed by: NURSE PRACTITIONER

## 2024-03-15 PROCEDURE — 94640 AIRWAY INHALATION TREATMENT: CPT

## 2024-03-15 PROCEDURE — 2700000000 HC OXYGEN THERAPY PER DAY

## 2024-03-15 PROCEDURE — 2580000003 HC RX 258: Performed by: NURSE PRACTITIONER

## 2024-03-15 PROCEDURE — 85025 COMPLETE CBC W/AUTO DIFF WBC: CPT

## 2024-03-15 PROCEDURE — 6370000000 HC RX 637 (ALT 250 FOR IP): Performed by: NURSE PRACTITIONER

## 2024-03-15 PROCEDURE — 80053 COMPREHEN METABOLIC PANEL: CPT

## 2024-03-15 RX ORDER — DOXYCYCLINE HYCLATE 100 MG/1
100 CAPSULE ORAL EVERY 12 HOURS SCHEDULED
Qty: 5 CAPSULE | Refills: 0 | Status: SHIPPED | OUTPATIENT
Start: 2024-03-15 | End: 2024-03-18

## 2024-03-15 RX ORDER — ALBUTEROL SULFATE 90 UG/1
2 AEROSOL, METERED RESPIRATORY (INHALATION) 4 TIMES DAILY PRN
Qty: 18 G | Refills: 2 | Status: SHIPPED | OUTPATIENT
Start: 2024-03-15

## 2024-03-15 RX ORDER — AMOXICILLIN AND CLAVULANATE POTASSIUM 875; 125 MG/1; MG/1
1 TABLET, FILM COATED ORAL EVERY 12 HOURS SCHEDULED
Qty: 5 TABLET | Refills: 0 | Status: SHIPPED | OUTPATIENT
Start: 2024-03-15 | End: 2024-03-18

## 2024-03-15 RX ORDER — PREDNISONE 20 MG/1
40 TABLET ORAL DAILY
Status: DISCONTINUED | OUTPATIENT
Start: 2024-03-16 | End: 2024-03-15 | Stop reason: HOSPADM

## 2024-03-15 RX ORDER — PREDNISONE 10 MG/1
TABLET ORAL
Qty: 30 TABLET | Refills: 0 | Status: SHIPPED | OUTPATIENT
Start: 2024-03-16 | End: 2024-03-28

## 2024-03-15 RX ORDER — FUROSEMIDE 40 MG/1
40 TABLET ORAL DAILY
Qty: 60 TABLET | Refills: 3 | Status: SHIPPED | OUTPATIENT
Start: 2024-03-16

## 2024-03-15 RX ADMIN — BUDESONIDE 500 MCG: 0.5 SUSPENSION RESPIRATORY (INHALATION) at 08:12

## 2024-03-15 RX ADMIN — SODIUM CHLORIDE, PRESERVATIVE FREE 10 ML: 5 INJECTION INTRAVENOUS at 08:48

## 2024-03-15 RX ADMIN — ARFORMOTEROL TARTRATE 15 MCG: 15 SOLUTION RESPIRATORY (INHALATION) at 08:12

## 2024-03-15 RX ADMIN — IPRATROPIUM BROMIDE AND ALBUTEROL SULFATE 1 DOSE: 2.5; .5 SOLUTION RESPIRATORY (INHALATION) at 08:12

## 2024-03-15 RX ADMIN — PANTOPRAZOLE SODIUM 40 MG: 40 TABLET, DELAYED RELEASE ORAL at 08:48

## 2024-03-15 RX ADMIN — DOXYCYCLINE HYCLATE 100 MG: 100 CAPSULE ORAL at 08:48

## 2024-03-15 RX ADMIN — IPRATROPIUM BROMIDE AND ALBUTEROL SULFATE 1 DOSE: 2.5; .5 SOLUTION RESPIRATORY (INHALATION) at 12:52

## 2024-03-15 RX ADMIN — FUROSEMIDE 40 MG: 40 TABLET ORAL at 08:47

## 2024-03-15 RX ADMIN — GUAIFENESIN 400 MG: 400 TABLET ORAL at 08:48

## 2024-03-15 RX ADMIN — AMOXICILLIN AND CLAVULANATE POTASSIUM 1 TABLET: 875; 125 TABLET, FILM COATED ORAL at 08:47

## 2024-03-15 NOTE — CARE COORDINATION
Patient eager to leave, threatening to leave AMA, not willing to wait for oxygen to be delivered, CM notified [facility]ilber[facility] to contact patient for deliver of concentrator and portable oxygen.  Cat DELGADO, RN  Case Management     Update: Edilberto from [facility] called and informed  that patient refused delivery.  Cat DELGADO, RN  Case Management

## 2024-03-15 NOTE — CARE COORDINATION
Pulm consult pending w/continued treatment for COPD exac and PNA on po doxy, augmentin, prednisone and lasix. Pt remains on 3l O2 which is new. Goal remains to wean, if unable to wean. New ambulatory pulse ox testing and orders needed if no discharge today w/[employer] following, [phone]. Pt resides at home alone in 3rd floor apartment uses stairs, no elevator. He completes his adl's with independence. Patient is established with Dr. Francois and denies any issue with retail pharmaceutical coverage uses [facility] on [address], [place].  Will follow.  Iwona Azevedo, JACN, RN  [facility] Case Management  [phone]

## 2024-03-15 NOTE — CONSULTS
Pulmonary Consultation    Admit Date: 3/14/2024  Requesting Physician: Tarun Francois DO    CC:  COPD exacerbation    HPI:    69 year old  male, current 108 py smoker, with unknown past medical history     3/13 Presented to [facility] for URI symptoms and lower extremity swelling  Transferred to [facility] for admission  Found to be hypoxic saturating 86% on room air   Viral panel positive for Coronavirus OC43  Pro bnp 2,480  CXR showing ground-glass airspace disease seen within the right and left perihilar regions  CTA chest negative for PE, Patchy pneumonia seen within the inferior aspect of the right upper lobe,  within the right middle lobe and to a lesser extent within the lingula.  The  pneumonia is most prominent within the lateral aspect of the right middle  lobe  3/15 Pulm consulted for COPD exacerbation    Patient seen at bedside on 3lnc, in no acute distress.  He is very frustrated and says he is waiting for his ride to come pick him up.  He wants to sign out AMA and says he does not trust any doctors. He is a current every day smoker 2 PPD for 54 years and daily etoh use consists of beer and whiskey.  He has never followed with pulmonary specialist and does not take any inhalers at home.  He says he has a PCP but does not follow regularly.  He is only taking multivitamin supplementation as an outpatient.  He is on room air at baseline.  Unknown occupational exposures, although he is still working as a  and exposed to fumes.  He denies chest pain, tightness, cough or wheezing.  Denies any issues with dysphagia.      PMH:    Past Medical History:   Diagnosis Date    Cataract      PSH:    Past Surgical History:   Procedure Laterality Date    EYE SURGERY            Respiratory ROS: Lower extremity edema. Otherwise, a complete review of systems is undertaken and is negative.    Social History:  Works as a   Current every day smoker 2PPD x54 years  Denies drug  COPD exacerbation        Assessment/plan:  Acute hypoxic respiratory failure  Baseline is room air  Wean as able for pulse ox greater than 90%  Ambulatory pulse ox prior to discharge   COPD/emphysema exacerbation  Continue Brovana, budesonide and Duonebs prn   Wean steroids, on prednisone 40mg daily  Outpatient PFT   Bilateral pneumonia   ? Aspiration  Cultures pending   On Augmentin and doxycyline    Coronavirus OC43  Acute new onset HFpEF   Probnp >2,000   3/14/24 echo EF 60 - 65%, grade I DD, RVSP 70 mmHg  On lasix    Severe pulmonary HTN RVSP 70 mmHg  Untreated COPD, probable KRYSTIN   Consider RHC   Tobacco abuse  Cessation encouraged with no plans to quit  Alcohol abuse  Monitor for withdrawal symptoms  Medical noncompliance  DVT prophylaxis with Lovenox                Thank you for allowing us to see this patient in consultation.  Please contact us with any questions.      Electronically signed by BAO Goyal NP on 3/15/2024 at 10:45 AM    Seen and examined, meds, labs, imaging and echo report reviewed. Case discussed with Dr. Mejia. PAH needs worked with RVSP of 70. Does have emphysema on ct and possible pneumonia. Agree with doxy, and amox clav for pneumonia with sputum growing nl taj.  Agree with treating for acute exac of copd with prednisone and will start maintenance Anoro and prn albuterol. He snores minimally and has no reported apnea per significant other but may benefit from PSG. Asked to follow-up in 3 weeks with pft.

## 2024-03-15 NOTE — PROGRESS NOTES
[facility] Quality Flow/Interdisciplinary Rounds Progress Note        Quality Flow Rounds held on March 15, 2024    Disciplines Attending:  Bedside Nurse, , , and Nursing Unit Leadership    Subhash Foremanmmeldon was admitted on 3/14/2024  1:28 AM    Anticipated Discharge Date:  Expected Discharge Date: 03/16/24    Disposition:    Matteo Score:  Matteo Scale Score: 22    Readmission Risk              Risk of Unplanned Readmission:  13           Discussed patient goal for the day, patient clinical progression, and barriers to discharge.  The following Goal(s) of the Day/Commitment(s) have been identified:   po abx, iv steroids, wean oxygne as able.      Azucena Torres RN  March 15, 2024

## 2024-03-15 NOTE — DISCHARGE INSTRUCTIONS
Please take your medications as directed.    Please follow up with your primary care doctor and pulmonologist.

## 2024-03-15 NOTE — PROGRESS NOTES
Pulse ox was 95% on room air at rest.   Ambulated patient on room air.  Oxygen saturation was 91% on room air while ambulating.   No oxygen applied.

## 2024-03-15 NOTE — DISCHARGE SUMMARY
[facility] Hospitalist Physician Discharge Summary       Tarun Francois, DO  [address] [facility] [id number] [phone]    Follow up in 1 week(s)  Hospital Follow up    Wilbert Willingham MD  [address]  [address]  [facility] [address]  [phone]    Follow up  Hospital Follow up      Activity level: As tolerated     Dispo: Home    Condition on discharge: Stable     Patient ID:  Subhash Rojo  [id number]  69 y.o.  1955    Admit date: 3/14/2024    Discharge date and time:  3/15/2024  2:04 PM    Admission Diagnoses: Principal Problem:    Acute hypoxic respiratory failure (HCC)  Active Problems:    Tobacco dependence    COPD exacerbation (HCC)    Pneumonia of right lung due to infectious organism    Elevated brain natriuretic peptide (BNP) level    Edema of both lower extremities    Pneumonia, unspecified organism  Resolved Problems:    * No resolved hospital problems. *      Discharge Diagnoses: Principal Problem:    Acute hypoxic respiratory failure (HCC)  Active Problems:    Tobacco dependence    COPD exacerbation (HCC)    Pneumonia of right lung due to infectious organism    Elevated brain natriuretic peptide (BNP) level    Edema of both lower extremities    Pneumonia, unspecified organism  Resolved Problems:    * No resolved hospital problems. *      Consults:  IP CONSULT TO PULMONOLOGY    Procedures: EKG, echo    Hospital Course:   Patient Subhash Rojo is a 69 y.o. presented with PNA (pneumonia) [J18.9]  Acute on chronic respiratory failure with hypoxia (HCC) [J96.21]  Pneumonia, unspecified organism [J18.9]  Patient has PMHx cataract s/p cataract surgery.  Patient presented with SOB.  Patient reporting runny nose, cough, MARTINEZ over the past few days.  He also noted that his legs have been swollen lately.  Patient is on room air at home, but in the ED was saturating 86% on room air, which improved after he was placed on 3 L oxygen.  He was found to have a BNP of 2480, and  03/15/24  0541   WBC 6.0 6.4   RBC 4.83 4.54   HGB 16.3 15.3   HCT 49.5 48.5   .5* 106.8*   MCH 33.7 33.7   MCHC 32.9 31.5*   RDW 14.9 15.1*    209   MPV 9.3 9.5       No results for input(s): \"POCGLU\" in the last 72 hours.    Imaging:  Echo (TTE) complete (PRN contrast/bubble/strain/3D)    Result Date: 3/14/2024    Left Ventricle: Normal left ventricular systolic function with a EF of 60 - 65%. Left ventricle size is normal. Mild concentric hypertrophy. Normal wall motion. Grade I diastolic dysfunction.   Right Ventricle: Right ventricle is mildly dilated. Normal systolic function.   Aortic Valve: Mildly thickened leaflets with reduced excursion. Mild aortic stenosis - peak gradient 21mmHg, mean 10 mmHg, FATOU 1.7cm2 by Vmax, 2.0cm2 by VTI; DLI 0.65   Tricuspid Valve: Mild to moderate regurgitation. Severe Pulmonary hypertension, RVSP estimated at 70mmHg.   Right Atrium: Right atrium is moderately dilated.   Image quality is adequate.   No prior study to compare.       Patient Instructions:      Medication List        START taking these medications      albuterol sulfate  (90 Base) MCG/ACT inhaler  Commonly known as: Ventolin HFA  Inhale 2 puffs into the lungs 4 times daily as needed for Wheezing     amoxicillin-clavulanate 875-125 MG per tablet  Commonly known as: AUGMENTIN  Take 1 tablet by mouth every 12 hours for 5 doses     doxycycline hyclate 100 MG capsule  Commonly known as: VIBRAMYCIN  Take 1 capsule by mouth every 12 hours for 5 doses     furosemide 40 MG tablet  Commonly known as: LASIX  Take 1 tablet by mouth daily  Start taking on: March 16, 2024     predniSONE 10 MG tablet  Commonly known as: DELTASONE  Take 4 tablets by mouth daily for 3 days, THEN 3 tablets daily for 3 days, THEN 2 tablets daily for 3 days, THEN 1 tablet daily for 3 days.  Start taking on: March 16, 2024     umeclidinium-vilanterol 62.5-25 MCG/ACT inhaler  Commonly known as: ANORO ELLIPTA  Inhale 1 puff into the

## 2024-03-15 NOTE — PROGRESS NOTES
Spoke with patient and patient's girlfriend. Patient is extremely adamant on leaving, educated on importance of not leaving against medical advice and the dangers of doing so. Spoke with Dr. Jackie child: patient wanting to leave Against Medical Advice. Patient stated \"I will give the Doctor half and hour, if he is not here I will be leaving'.

## 2024-03-17 LAB
MICROORGANISM SPEC CULT: NORMAL
MICROORGANISM SPEC CULT: NORMAL
SERVICE CMNT-IMP: NORMAL
SERVICE CMNT-IMP: NORMAL
SPECIMEN DESCRIPTION: NORMAL
SPECIMEN DESCRIPTION: NORMAL
